# Patient Record
Sex: FEMALE | Race: WHITE | NOT HISPANIC OR LATINO | Employment: OTHER | URBAN - METROPOLITAN AREA
[De-identification: names, ages, dates, MRNs, and addresses within clinical notes are randomized per-mention and may not be internally consistent; named-entity substitution may affect disease eponyms.]

---

## 2023-05-04 ENCOUNTER — OFFICE VISIT (OUTPATIENT)
Dept: RHEUMATOLOGY | Facility: CLINIC | Age: 73
End: 2023-05-04
Payer: MEDICARE

## 2023-05-04 VITALS
HEIGHT: 64 IN | HEART RATE: 63 BPM | WEIGHT: 134.81 LBS | SYSTOLIC BLOOD PRESSURE: 135 MMHG | BODY MASS INDEX: 23.02 KG/M2 | DIASTOLIC BLOOD PRESSURE: 71 MMHG | TEMPERATURE: 98 F | OXYGEN SATURATION: 96 % | RESPIRATION RATE: 18 BRPM

## 2023-05-04 DIAGNOSIS — M15.9 OSTEOARTHRITIS OF MULTIPLE JOINTS, UNSPECIFIED OSTEOARTHRITIS TYPE: Primary | ICD-10-CM

## 2023-05-04 DIAGNOSIS — M85.80 OSTEOPENIA, UNSPECIFIED LOCATION: ICD-10-CM

## 2023-05-04 DIAGNOSIS — G47.00 INSOMNIA, UNSPECIFIED TYPE: ICD-10-CM

## 2023-05-04 PROCEDURE — 3288F FALL RISK ASSESSMENT DOCD: CPT | Mod: CPTII,S$GLB,, | Performed by: INTERNAL MEDICINE

## 2023-05-04 PROCEDURE — 3008F BODY MASS INDEX DOCD: CPT | Mod: CPTII,S$GLB,, | Performed by: INTERNAL MEDICINE

## 2023-05-04 PROCEDURE — 1160F PR REVIEW ALL MEDS BY PRESCRIBER/CLIN PHARMACIST DOCUMENTED: ICD-10-PCS | Mod: CPTII,S$GLB,, | Performed by: INTERNAL MEDICINE

## 2023-05-04 PROCEDURE — 99999 PR PBB SHADOW E&M-EST. PATIENT-LVL IV: ICD-10-PCS | Mod: PBBFAC,,, | Performed by: INTERNAL MEDICINE

## 2023-05-04 PROCEDURE — 1160F RVW MEDS BY RX/DR IN RCRD: CPT | Mod: CPTII,S$GLB,, | Performed by: INTERNAL MEDICINE

## 2023-05-04 PROCEDURE — 3075F SYST BP GE 130 - 139MM HG: CPT | Mod: CPTII,S$GLB,, | Performed by: INTERNAL MEDICINE

## 2023-05-04 PROCEDURE — 3008F PR BODY MASS INDEX (BMI) DOCUMENTED: ICD-10-PCS | Mod: CPTII,S$GLB,, | Performed by: INTERNAL MEDICINE

## 2023-05-04 PROCEDURE — 3288F PR FALLS RISK ASSESSMENT DOCUMENTED: ICD-10-PCS | Mod: CPTII,S$GLB,, | Performed by: INTERNAL MEDICINE

## 2023-05-04 PROCEDURE — 1126F PR PAIN SEVERITY QUANTIFIED, NO PAIN PRESENT: ICD-10-PCS | Mod: CPTII,S$GLB,, | Performed by: INTERNAL MEDICINE

## 2023-05-04 PROCEDURE — 99999 PR PBB SHADOW E&M-EST. PATIENT-LVL IV: CPT | Mod: PBBFAC,,, | Performed by: INTERNAL MEDICINE

## 2023-05-04 PROCEDURE — 3078F PR MOST RECENT DIASTOLIC BLOOD PRESSURE < 80 MM HG: ICD-10-PCS | Mod: CPTII,S$GLB,, | Performed by: INTERNAL MEDICINE

## 2023-05-04 PROCEDURE — 99204 PR OFFICE/OUTPT VISIT, NEW, LEVL IV, 45-59 MIN: ICD-10-PCS | Mod: S$GLB,,, | Performed by: INTERNAL MEDICINE

## 2023-05-04 PROCEDURE — 1159F PR MEDICATION LIST DOCUMENTED IN MEDICAL RECORD: ICD-10-PCS | Mod: CPTII,S$GLB,, | Performed by: INTERNAL MEDICINE

## 2023-05-04 PROCEDURE — 99204 OFFICE O/P NEW MOD 45 MIN: CPT | Mod: S$GLB,,, | Performed by: INTERNAL MEDICINE

## 2023-05-04 PROCEDURE — 1101F PT FALLS ASSESS-DOCD LE1/YR: CPT | Mod: CPTII,S$GLB,, | Performed by: INTERNAL MEDICINE

## 2023-05-04 PROCEDURE — 3078F DIAST BP <80 MM HG: CPT | Mod: CPTII,S$GLB,, | Performed by: INTERNAL MEDICINE

## 2023-05-04 PROCEDURE — 1101F PR PT FALLS ASSESS DOC 0-1 FALLS W/OUT INJ PAST YR: ICD-10-PCS | Mod: CPTII,S$GLB,, | Performed by: INTERNAL MEDICINE

## 2023-05-04 PROCEDURE — 1159F MED LIST DOCD IN RCRD: CPT | Mod: CPTII,S$GLB,, | Performed by: INTERNAL MEDICINE

## 2023-05-04 PROCEDURE — 3075F PR MOST RECENT SYSTOLIC BLOOD PRESS GE 130-139MM HG: ICD-10-PCS | Mod: CPTII,S$GLB,, | Performed by: INTERNAL MEDICINE

## 2023-05-04 PROCEDURE — 1126F AMNT PAIN NOTED NONE PRSNT: CPT | Mod: CPTII,S$GLB,, | Performed by: INTERNAL MEDICINE

## 2023-05-04 RX ORDER — ERGOCALCIFEROL 1.25 MG/1
50000 CAPSULE ORAL
COMMUNITY
Start: 2023-04-17

## 2023-05-04 RX ORDER — TRAZODONE HYDROCHLORIDE 50 MG/1
50 TABLET ORAL NIGHTLY
COMMUNITY
Start: 2023-02-16

## 2023-05-04 RX ORDER — PANTOPRAZOLE SODIUM 40 MG/1
40 TABLET, DELAYED RELEASE ORAL
COMMUNITY
Start: 2023-02-16

## 2023-05-04 RX ORDER — SIMVASTATIN 10 MG/1
10 TABLET, FILM COATED ORAL NIGHTLY
COMMUNITY
Start: 2023-02-16

## 2023-05-04 RX ORDER — ASPIRIN 81 MG/1
81 TABLET ORAL
COMMUNITY

## 2023-05-04 NOTE — PROGRESS NOTES
"Subjective:       Patient ID: Cristina Robert is a 73 y.o. female.    Chief Complaint: New Patient (No complaint at present )    Referred for positive BURT. No signs nor symptoms of connective tissue disease.   Her complaints are s/o osteoarthritis  Pt  is complaining of joint pain involving his MCP PIP wrist elbow shoulders hips knees and ankles bilaterally.  Is 1/10 in intensity dull in quality and continuous.  It is associated with a morning stiffness lasting for less than 60 minutes. Pt reports having difficulty maintaining a good night of sleep and this has been associated with myalgia of 1/10 in intensity.  This pain is dull continuous and gets worse mainly at night.  It is associated with fatigue.  No fever no chills no others.  Labs checked during this visit         Review of Systems   Constitutional:  Negative for appetite change, chills and fever.   HENT:  Negative for congestion, ear pain, mouth sores, nosebleeds and trouble swallowing.    Eyes:  Negative for photophobia and discharge.   Respiratory:  Negative for chest tightness and shortness of breath.    Cardiovascular:  Negative for chest pain.   Gastrointestinal:  Negative for abdominal pain and vomiting.   Endocrine: Negative.    Genitourinary:  Negative for hematuria.   Musculoskeletal:         As per HPI   Skin:  Negative for rash.   Neurological:  Negative for weakness.       Objective:   /71 (BP Location: Left arm, Patient Position: Sitting, BP Method: Small (Automatic))   Pulse 63   Temp 97.6 °F (36.4 °C)   Resp 18   Ht 5' 4" (1.626 m)   Wt 61.1 kg (134 lb 12.8 oz)   LMP  (LMP Unknown)   SpO2 96%   BMI 23.14 kg/m²      Physical Exam   Constitutional: She is oriented to person, place, and time. She appears well-developed and well-nourished. No distress.   HENT:   Head: Normocephalic and atraumatic.   Right Ear: External ear normal.   Left Ear: External ear normal.   Eyes: Pupils are equal, round, and reactive to light. "   Cardiovascular: Normal rate, regular rhythm and normal heart sounds.   Pulmonary/Chest: Breath sounds normal.   Abdominal: Soft. There is no abdominal tenderness.   Musculoskeletal:      Right shoulder: Normal.      Left shoulder: Normal.      Right elbow: Normal.      Left elbow: Normal.      Right wrist: Normal.      Left wrist: Normal.      Cervical back: Neck supple.      Right hip: Normal.      Left hip: Normal.      Right knee: Normal.      Left knee: Normal.   Lymphadenopathy:     She has no cervical adenopathy.   Neurological: She is alert and oriented to person, place, and time. She displays normal reflexes. No cranial nerve deficit or sensory deficit. She exhibits normal muscle tone. Coordination normal.   Skin: No rash noted. No erythema.   Vitals reviewed.      Right Side Rheumatological Exam     Examination finds the shoulder, elbow, wrist, knee, 1st PIP, 1st MCP, 2nd PIP, 2nd MCP, 3rd PIP, 3rd MCP, 4th PIP, 4th MCP, 5th PIP, 5th MCP, temporomandibular, hip, ankle, 1st MTP, 2nd MTP, 3rd MTP, 4th MTP and 5th MTP normal.    Left Side Rheumatological Exam     Examination finds the shoulder, elbow, wrist, knee, 1st PIP, 1st MCP, 2nd PIP, 2nd MCP, 3rd PIP, 3rd MCP, 4th PIP, 4th MCP, 5th PIP, 5th MCP, temporomandibular, hip, ankle, 1st MTP, 2nd MTP, 3rd MTP, 4th MTP and 5th MTP normal.       Completed Fibromyalgia exam 11/18 tender points.  No data to display     Assessment:       1. Osteoarthritis of multiple joints, unspecified osteoarthritis type    2. Insomnia, unspecified type    3. Osteopenia, unspecified location          Medication List with Changes/Refills   Current Medications    ASPIRIN (ECOTRIN) 81 MG EC TABLET    Take 81 mg by mouth.       Start Date: --        End Date: --    ERGOCALCIFEROL (ERGOCALCIFEROL) 50,000 UNIT CAP    Take 50,000 Units by mouth every 7 days.       Start Date: 4/17/2023 End Date: --    PANTOPRAZOLE (PROTONIX) 40 MG TABLET    Take 40 mg by mouth.       Start Date:  2/16/2023 End Date: --    SIMVASTATIN (ZOCOR) 10 MG TABLET    Take 10 mg by mouth every evening.       Start Date: 2/16/2023 End Date: --    TRAZODONE (DESYREL) 50 MG TABLET    Take 50 mg by mouth every evening.       Start Date: 2/16/2023 End Date: --         Plan:         Problem List Items Addressed This Visit          Orthopedic    Osteoarthritis of multiple joints - Primary    Relevant Medications    simvastatin (ZOCOR) 10 MG tablet    aspirin (ECOTRIN) 81 MG EC tablet    traZODone (DESYREL) 50 MG tablet    pantoprazole (PROTONIX) 40 MG tablet    ergocalciferol (ERGOCALCIFEROL) 50,000 unit Cap    Osteopenia    Relevant Medications    simvastatin (ZOCOR) 10 MG tablet    aspirin (ECOTRIN) 81 MG EC tablet    traZODone (DESYREL) 50 MG tablet    pantoprazole (PROTONIX) 40 MG tablet    ergocalciferol (ERGOCALCIFEROL) 50,000 unit Cap       Other    Insomnia    Relevant Medications    simvastatin (ZOCOR) 10 MG tablet    aspirin (ECOTRIN) 81 MG EC tablet    traZODone (DESYREL) 50 MG tablet    pantoprazole (PROTONIX) 40 MG tablet    ergocalciferol (ERGOCALCIFEROL) 50,000 unit Cap       TUMERIC 500MG PO BID OTC

## 2024-04-17 ENCOUNTER — OFFICE VISIT (OUTPATIENT)
Dept: URGENT CARE | Facility: CLINIC | Age: 74
End: 2024-04-17
Payer: MEDICARE

## 2024-04-17 VITALS
SYSTOLIC BLOOD PRESSURE: 130 MMHG | RESPIRATION RATE: 18 BRPM | BODY MASS INDEX: 22.2 KG/M2 | TEMPERATURE: 98 F | DIASTOLIC BLOOD PRESSURE: 77 MMHG | HEART RATE: 65 BPM | WEIGHT: 130 LBS | HEIGHT: 64 IN | OXYGEN SATURATION: 97 %

## 2024-04-17 DIAGNOSIS — S60.352A FOREIGN BODY OF LEFT THUMB, INITIAL ENCOUNTER: Primary | ICD-10-CM

## 2024-04-17 PROCEDURE — 90714 TD VACC NO PRESV 7 YRS+ IM: CPT | Mod: ,,, | Performed by: PHYSICIAN ASSISTANT

## 2024-04-17 PROCEDURE — 99203 OFFICE O/P NEW LOW 30 MIN: CPT | Mod: 25,,, | Performed by: PHYSICIAN ASSISTANT

## 2024-04-17 PROCEDURE — 90471 IMMUNIZATION ADMIN: CPT | Mod: ,,, | Performed by: PHYSICIAN ASSISTANT

## 2024-04-17 RX ORDER — SULFAMETHOXAZOLE AND TRIMETHOPRIM 800; 160 MG/1; MG/1
1 TABLET ORAL 2 TIMES DAILY
Qty: 20 TABLET | Refills: 0 | Status: SHIPPED | OUTPATIENT
Start: 2024-04-17 | End: 2024-04-27

## 2024-04-17 RX ORDER — MUPIROCIN 20 MG/G
OINTMENT TOPICAL 3 TIMES DAILY
Qty: 1 EACH | Refills: 1 | Status: SHIPPED | OUTPATIENT
Start: 2024-04-17 | End: 2024-04-27

## 2024-04-17 NOTE — PROGRESS NOTES
"Subjective:      Patient ID: Cristina Robert is a 73 y.o. female.    Vitals:  height is 5' 4" (1.626 m) and weight is 59 kg (130 lb). Her oral temperature is 98.2 °F (36.8 °C). Her blood pressure is 130/77 and her pulse is 65. Her respiration is 18 and oxygen saturation is 97%.     Chief Complaint: thumb pain        Patient is a 73-year-old female who complains of may few day history of pain in the left thumb.  She was doing some gardening and feel as though she was poked in the thumb by a thorn.  She has had some tenderness in this area and occasional purulent drainage.  Denies any fever.        Skin:  Positive for erythema.      Objective:     Physical Exam   HENT:   Head: Normocephalic and atraumatic.   Neck: Neck supple.   Abdominal: Normal appearance.   Neurological: She is alert.   Skin: erythema and lesion   Left thumb volar aspect there is a small area of induration and erythema.  Written consent obtained.  Area was prepped with Betadine followed by alcohol.  0.5 mL of lidocaine injected into the area.  An 18 gauge needle was used to remove the epidermis in a small central area.  A small amount of purulent drainage was removed.  I did not visualize the foreign body.  Bactroban and a bandage applied by the nursing staff.    I discussed with the patient that we need to monitor symptoms over the next few days.  If symptoms continue refer her to a hand surgeon.         Previous History      Review of patient's allergies indicates:  No Known Allergies    Past Medical History:   Diagnosis Date    High cholesterol      Current Outpatient Medications   Medication Instructions    aspirin (ECOTRIN) 81 mg, Oral    ergocalciferol (ERGOCALCIFEROL) 50,000 Units, Oral, Every 7 days    mupirocin (BACTROBAN) 2 % ointment Topical (Top), 3 times daily    pantoprazole (PROTONIX) 40 mg, Oral    simvastatin (ZOCOR) 10 mg, Oral, Nightly    sulfamethoxazole-trimethoprim 800-160mg (BACTRIM DS) 800-160 mg Tab 1 tablet, Oral, 2 times " "daily    traZODone (DESYREL) 50 mg, Oral, Nightly     Past Surgical History:   Procedure Laterality Date    COLON SURGERY       No family history on file.    Social History     Tobacco Use    Smoking status: Every Day     Current packs/day: 0.50     Types: Cigarettes    Smokeless tobacco: Never   Substance Use Topics    Alcohol use: Never    Drug use: Never        Physical Exam      Vital Signs Reviewed   /77   Pulse 65   Temp 98.2 °F (36.8 °C) (Oral)   Resp 18   Ht 5' 4" (1.626 m)   Wt 59 kg (130 lb)   LMP  (LMP Unknown)   SpO2 97%   BMI 22.31 kg/m²        Procedures    Procedures     Labs   No results found for this or any previous visit.    Assessment:     1. Foreign body of left thumb, initial encounter        Plan:       Foreign body of left thumb, initial encounter    Other orders  -     sulfamethoxazole-trimethoprim 800-160mg (BACTRIM DS) 800-160 mg Tab; Take 1 tablet by mouth 2 (two) times daily. for 10 days  Dispense: 20 tablet; Refill: 0  -     mupirocin (BACTROBAN) 2 % ointment; Apply topically 3 (three) times daily. for 10 days  Dispense: 1 each; Refill: 1  -     tetanus-diphther toxoids vaccine (PF) (TENIVAC) injection        Wound Care: Twice daily wound care as discussed.   Pain: Take OTC Tylenol or Ibuprofen per package instructions as needed for pain.  Loosen the bandage if needed.   Follow up in 2 days if needed.  Present to the Emergency Department for any significant change or worsening symptoms including worsening redness, swelling, purulent discharge, fever, body aches, or chills.              "

## 2024-04-17 NOTE — PATIENT INSTRUCTIONS
Wound Care: Twice daily wound care as discussed.   Pain: Take OTC Tylenol or Ibuprofen per package instructions as needed for pain.  Loosen the bandage if needed.   Follow up in 2 days if needed.  Present to the Emergency Department for any significant change or worsening symptoms including worsening redness, swelling, purulent discharge, fever, body aches, or chills.      Smoking cessation strongly encouraged.  Assistance offered, information will be provided.  If not ready to quit now, patient will contact this clinic in the future if I can be of any specific help related to the discontinuation of smoking/tobacco use.

## 2025-03-25 ENCOUNTER — LAB REQUISITION (OUTPATIENT)
Dept: LAB | Facility: HOSPITAL | Age: 75
End: 2025-03-25
Payer: MEDICARE

## 2025-03-25 DIAGNOSIS — L03.031 CELLULITIS OF RIGHT TOE: ICD-10-CM

## 2025-03-25 LAB
GRAM STN SPEC: NORMAL
GRAM STN SPEC: NORMAL

## 2025-03-25 PROCEDURE — 87205 SMEAR GRAM STAIN: CPT | Performed by: PODIATRIST

## 2025-03-25 PROCEDURE — 87075 CULTR BACTERIA EXCEPT BLOOD: CPT | Performed by: PODIATRIST

## 2025-03-25 PROCEDURE — 87070 CULTURE OTHR SPECIMN AEROBIC: CPT | Performed by: PODIATRIST

## 2025-03-28 LAB — BACTERIA WND CULT: NORMAL

## 2025-03-29 LAB — BACTERIA SPEC ANAEROBE CULT: NORMAL
